# Patient Record
Sex: MALE | Race: BLACK OR AFRICAN AMERICAN | NOT HISPANIC OR LATINO | URBAN - METROPOLITAN AREA
[De-identification: names, ages, dates, MRNs, and addresses within clinical notes are randomized per-mention and may not be internally consistent; named-entity substitution may affect disease eponyms.]

---

## 2021-07-01 ENCOUNTER — OUTPATIENT (OUTPATIENT)
Dept: OUTPATIENT SERVICES | Facility: HOSPITAL | Age: 46
LOS: 1 days | End: 2021-07-01
Payer: MEDICAID

## 2021-07-01 ENCOUNTER — OUTPATIENT (OUTPATIENT)
Dept: OUTPATIENT SERVICES | Facility: HOSPITAL | Age: 46
LOS: 1 days | End: 2021-07-01

## 2021-07-07 ENCOUNTER — INPATIENT (INPATIENT)
Facility: HOSPITAL | Age: 46
LOS: 0 days | Discharge: HOME | DRG: 202 | End: 2021-07-08
Attending: STUDENT IN AN ORGANIZED HEALTH CARE EDUCATION/TRAINING PROGRAM | Admitting: HOSPITALIST
Payer: COMMERCIAL

## 2021-07-07 VITALS
RESPIRATION RATE: 30 BRPM | HEART RATE: 112 BPM | DIASTOLIC BLOOD PRESSURE: 74 MMHG | OXYGEN SATURATION: 94 % | SYSTOLIC BLOOD PRESSURE: 140 MMHG | TEMPERATURE: 98 F

## 2021-07-07 DIAGNOSIS — R00.0 TACHYCARDIA, UNSPECIFIED: ICD-10-CM

## 2021-07-07 DIAGNOSIS — J45.901 UNSPECIFIED ASTHMA WITH (ACUTE) EXACERBATION: ICD-10-CM

## 2021-07-07 DIAGNOSIS — I10 ESSENTIAL (PRIMARY) HYPERTENSION: ICD-10-CM

## 2021-07-07 DIAGNOSIS — R63.8 OTHER SYMPTOMS AND SIGNS CONCERNING FOOD AND FLUID INTAKE: ICD-10-CM

## 2021-07-07 LAB
ALBUMIN SERPL ELPH-MCNC: 4.5 G/DL — SIGNIFICANT CHANGE UP (ref 3.3–5)
ALP SERPL-CCNC: 79 U/L — SIGNIFICANT CHANGE UP (ref 40–120)
ALT FLD-CCNC: 24 U/L — SIGNIFICANT CHANGE UP (ref 10–45)
ANION GAP SERPL CALC-SCNC: 10 MMOL/L — SIGNIFICANT CHANGE UP (ref 5–17)
APTT BLD: 28.6 SEC — SIGNIFICANT CHANGE UP (ref 27.5–35.5)
AST SERPL-CCNC: 18 U/L — SIGNIFICANT CHANGE UP (ref 10–40)
BASE EXCESS BLDV CALC-SCNC: 4.5 MMOL/L — HIGH (ref -2–3)
BASE EXCESS BLDV CALC-SCNC: 5.2 MMOL/L — HIGH (ref -2–3)
BASOPHILS # BLD AUTO: 0.02 K/UL — SIGNIFICANT CHANGE UP (ref 0–0.2)
BASOPHILS NFR BLD AUTO: 0.2 % — SIGNIFICANT CHANGE UP (ref 0–2)
BILIRUB SERPL-MCNC: 0.3 MG/DL — SIGNIFICANT CHANGE UP (ref 0.2–1.2)
BUN SERPL-MCNC: 15 MG/DL — SIGNIFICANT CHANGE UP (ref 7–23)
CA-I SERPL-SCNC: 1.2 MMOL/L — SIGNIFICANT CHANGE UP (ref 1.15–1.33)
CA-I SERPL-SCNC: 1.21 MMOL/L — SIGNIFICANT CHANGE UP (ref 1.15–1.33)
CALCIUM SERPL-MCNC: 9.7 MG/DL — SIGNIFICANT CHANGE UP (ref 8.4–10.5)
CHLORIDE SERPL-SCNC: 95 MMOL/L — LOW (ref 96–108)
CO2 BLDV-SCNC: 35.8 MMOL/L — HIGH (ref 22–26)
CO2 BLDV-SCNC: 37.2 MMOL/L — HIGH (ref 22–26)
CO2 SERPL-SCNC: 32 MMOL/L — HIGH (ref 22–31)
CREAT SERPL-MCNC: 0.92 MG/DL — SIGNIFICANT CHANGE UP (ref 0.5–1.3)
EOSINOPHIL # BLD AUTO: 0.02 K/UL — SIGNIFICANT CHANGE UP (ref 0–0.5)
EOSINOPHIL NFR BLD AUTO: 0.2 % — SIGNIFICANT CHANGE UP (ref 0–6)
GAS PNL BLDV: 133 MMOL/L — LOW (ref 136–145)
GAS PNL BLDV: 134 MMOL/L — LOW (ref 136–145)
GAS PNL BLDV: SIGNIFICANT CHANGE UP
GLUCOSE BLDC GLUCOMTR-MCNC: 165 MG/DL — HIGH (ref 70–99)
GLUCOSE SERPL-MCNC: 209 MG/DL — HIGH (ref 70–99)
HCO3 BLDV-SCNC: 34 MMOL/L — HIGH (ref 22–29)
HCO3 BLDV-SCNC: 35 MMOL/L — HIGH (ref 22–29)
HCT VFR BLD CALC: 50.2 % — HIGH (ref 39–50)
HGB BLD-MCNC: 16.8 G/DL — SIGNIFICANT CHANGE UP (ref 13–17)
IMM GRANULOCYTES NFR BLD AUTO: 0.5 % — SIGNIFICANT CHANGE UP (ref 0–1.5)
INR BLD: 1.26 — HIGH (ref 0.88–1.16)
LACTATE SERPL-SCNC: 1.6 MMOL/L — SIGNIFICANT CHANGE UP (ref 0.5–2)
LYMPHOCYTES # BLD AUTO: 1.74 K/UL — SIGNIFICANT CHANGE UP (ref 1–3.3)
LYMPHOCYTES # BLD AUTO: 16.1 % — SIGNIFICANT CHANGE UP (ref 13–44)
MAGNESIUM SERPL-MCNC: 2.2 MG/DL — SIGNIFICANT CHANGE UP (ref 1.6–2.6)
MCHC RBC-ENTMCNC: 30.5 PG — SIGNIFICANT CHANGE UP (ref 27–34)
MCHC RBC-ENTMCNC: 33.5 GM/DL — SIGNIFICANT CHANGE UP (ref 32–36)
MCV RBC AUTO: 91.1 FL — SIGNIFICANT CHANGE UP (ref 80–100)
MONOCYTES # BLD AUTO: 0.71 K/UL — SIGNIFICANT CHANGE UP (ref 0–0.9)
MONOCYTES NFR BLD AUTO: 6.6 % — SIGNIFICANT CHANGE UP (ref 2–14)
NEUTROPHILS # BLD AUTO: 8.29 K/UL — HIGH (ref 1.8–7.4)
NEUTROPHILS NFR BLD AUTO: 76.4 % — SIGNIFICANT CHANGE UP (ref 43–77)
NRBC # BLD: 0 /100 WBCS — SIGNIFICANT CHANGE UP (ref 0–0)
NT-PROBNP SERPL-SCNC: 50 PG/ML — SIGNIFICANT CHANGE UP (ref 0–300)
PCO2 BLDV: 67 MMHG — HIGH (ref 42–55)
PCO2 BLDV: 81 MMHG — HIGH (ref 42–55)
PH BLDV: 7.24 — LOW (ref 7.32–7.43)
PH BLDV: 7.31 — LOW (ref 7.32–7.43)
PLATELET # BLD AUTO: 348 K/UL — SIGNIFICANT CHANGE UP (ref 150–400)
PO2 BLDV: 34 MMHG — SIGNIFICANT CHANGE UP
PO2 BLDV: 37 MMHG — SIGNIFICANT CHANGE UP
POTASSIUM BLDV-SCNC: 3.4 MMOL/L — LOW (ref 3.5–5.1)
POTASSIUM BLDV-SCNC: 4 MMOL/L — SIGNIFICANT CHANGE UP (ref 3.5–5.1)
POTASSIUM SERPL-MCNC: 4 MMOL/L — SIGNIFICANT CHANGE UP (ref 3.5–5.3)
POTASSIUM SERPL-SCNC: 4 MMOL/L — SIGNIFICANT CHANGE UP (ref 3.5–5.3)
PROT SERPL-MCNC: 8 G/DL — SIGNIFICANT CHANGE UP (ref 6–8.3)
PROTHROM AB SERPL-ACNC: 15 SEC — HIGH (ref 10.6–13.6)
RBC # BLD: 5.51 M/UL — SIGNIFICANT CHANGE UP (ref 4.2–5.8)
RBC # FLD: 12.3 % — SIGNIFICANT CHANGE UP (ref 10.3–14.5)
SAO2 % BLDV: 39.5 % — SIGNIFICANT CHANGE UP
SAO2 % BLDV: 51.8 % — SIGNIFICANT CHANGE UP
SODIUM SERPL-SCNC: 137 MMOL/L — SIGNIFICANT CHANGE UP (ref 135–145)
TROPONIN T SERPL-MCNC: 0.01 NG/ML — SIGNIFICANT CHANGE UP (ref 0–0.01)
WBC # BLD: 10.83 K/UL — HIGH (ref 3.8–10.5)
WBC # FLD AUTO: 10.83 K/UL — HIGH (ref 3.8–10.5)

## 2021-07-07 PROCEDURE — 93010 ELECTROCARDIOGRAM REPORT: CPT

## 2021-07-07 PROCEDURE — 99223 1ST HOSP IP/OBS HIGH 75: CPT

## 2021-07-07 PROCEDURE — 99291 CRITICAL CARE FIRST HOUR: CPT

## 2021-07-07 PROCEDURE — 71045 X-RAY EXAM CHEST 1 VIEW: CPT | Mod: 26

## 2021-07-07 RX ORDER — DEXTROSE 50 % IN WATER 50 %
15 SYRINGE (ML) INTRAVENOUS ONCE
Refills: 0 | Status: DISCONTINUED | OUTPATIENT
Start: 2021-07-07 | End: 2021-07-08

## 2021-07-07 RX ORDER — ALBUTEROL 90 UG/1
2.5 AEROSOL, METERED ORAL
Refills: 0 | Status: COMPLETED | OUTPATIENT
Start: 2021-07-07 | End: 2021-07-07

## 2021-07-07 RX ORDER — GLUCAGON INJECTION, SOLUTION 0.5 MG/.1ML
1 INJECTION, SOLUTION SUBCUTANEOUS ONCE
Refills: 0 | Status: DISCONTINUED | OUTPATIENT
Start: 2021-07-07 | End: 2021-07-08

## 2021-07-07 RX ORDER — MAGNESIUM SULFATE 500 MG/ML
2 VIAL (ML) INJECTION ONCE
Refills: 0 | Status: COMPLETED | OUTPATIENT
Start: 2021-07-07 | End: 2021-07-07

## 2021-07-07 RX ORDER — INSULIN LISPRO 100/ML
VIAL (ML) SUBCUTANEOUS
Refills: 0 | Status: DISCONTINUED | OUTPATIENT
Start: 2021-07-07 | End: 2021-07-08

## 2021-07-07 RX ORDER — DEXTROSE 50 % IN WATER 50 %
25 SYRINGE (ML) INTRAVENOUS ONCE
Refills: 0 | Status: DISCONTINUED | OUTPATIENT
Start: 2021-07-07 | End: 2021-07-08

## 2021-07-07 RX ORDER — SODIUM CHLORIDE 9 MG/ML
1000 INJECTION, SOLUTION INTRAVENOUS
Refills: 0 | Status: DISCONTINUED | OUTPATIENT
Start: 2021-07-07 | End: 2021-07-08

## 2021-07-07 RX ORDER — IPRATROPIUM/ALBUTEROL SULFATE 18-103MCG
3 AEROSOL WITH ADAPTER (GRAM) INHALATION ONCE
Refills: 0 | Status: COMPLETED | OUTPATIENT
Start: 2021-07-07 | End: 2021-07-07

## 2021-07-07 RX ORDER — PANTOPRAZOLE SODIUM 20 MG/1
40 TABLET, DELAYED RELEASE ORAL
Refills: 0 | Status: DISCONTINUED | OUTPATIENT
Start: 2021-07-07 | End: 2021-07-08

## 2021-07-07 RX ORDER — DEXTROSE 50 % IN WATER 50 %
12.5 SYRINGE (ML) INTRAVENOUS ONCE
Refills: 0 | Status: DISCONTINUED | OUTPATIENT
Start: 2021-07-07 | End: 2021-07-08

## 2021-07-07 RX ORDER — IPRATROPIUM/ALBUTEROL SULFATE 18-103MCG
3 AEROSOL WITH ADAPTER (GRAM) INHALATION EVERY 6 HOURS
Refills: 0 | Status: DISCONTINUED | OUTPATIENT
Start: 2021-07-07 | End: 2021-07-08

## 2021-07-07 RX ADMIN — ALBUTEROL 2.5 MILLIGRAM(S): 90 AEROSOL, METERED ORAL at 22:16

## 2021-07-07 RX ADMIN — Medication 2 GRAM(S): at 23:06

## 2021-07-07 RX ADMIN — ALBUTEROL 2.5 MILLIGRAM(S): 90 AEROSOL, METERED ORAL at 21:10

## 2021-07-07 RX ADMIN — ALBUTEROL 2.5 MILLIGRAM(S): 90 AEROSOL, METERED ORAL at 21:09

## 2021-07-07 RX ADMIN — ALBUTEROL 2.5 MILLIGRAM(S): 90 AEROSOL, METERED ORAL at 21:11

## 2021-07-07 RX ADMIN — Medication 3 MILLILITER(S): at 21:09

## 2021-07-07 RX ADMIN — Medication 50 GRAM(S): at 21:59

## 2021-07-07 RX ADMIN — Medication 125 MILLIGRAM(S): at 21:09

## 2021-07-07 RX ADMIN — ALBUTEROL 2.5 MILLIGRAM(S): 90 AEROSOL, METERED ORAL at 21:58

## 2021-07-07 RX ADMIN — ALBUTEROL 2.5 MILLIGRAM(S): 90 AEROSOL, METERED ORAL at 22:46

## 2021-07-07 NOTE — H&P ADULT - NSHPLABSRESULTS_GEN_ALL_CORE
.  LABS                        16.8   10.83 )-----------( 348      ( 07 Jul 2021 21:09 )             50.2     07-07    137  |  95<L>  |  15  ----------------------------<  209<H>  4.0   |  32<H>  |  0.92    Ca    9.7      07 Jul 2021 21:09  Mg     2.2     07-07    TPro  8.0  /  Alb  4.5  /  TBili  0.3  /  DBili  x   /  AST  18  /  ALT  24  /  AlkPhos  79  07-07    PT/INR - ( 07 Jul 2021 21:09 )   PT: 15.0 sec;   INR: 1.26          PTT - ( 07 Jul 2021 21:09 )  PTT:28.6 sec    CARDIAC MARKERS ( 07 Jul 2021 21:09 )  x     / 0.01 ng/mL / x     / x     / x            RADIOLOGY & ADDITIONAL TESTS: Reviewed

## 2021-07-07 NOTE — H&P ADULT - ATTENDING COMMENTS
44 y/o male with PMHx asthma (no intubations) presents to ED via EMS for shortness of breath. Intermittently placed on BiPAP for which he is now weaned off to 4LNC. Admitted to [facility] for asthma exacerbation.     #Asthma Exacerbation: Pt w/ hx of asthma exacerbations, seasonal and poorly controlled asthma; uses rescue inhaler 2-3x a day; presents with worsening SOB/Tachypnea for one day. No fevers/chills; no cough/sputum production +nasal congestion; CXR w/o acute infiltrate; s/p mag, nebs and solumedrol 125x1 with significant improvement; repeat vbg improved; now off BiPAP; unlcear etiology of exacerbation maybe seasonal vs viral URI; f/up RVP and procal; c/w solumedrol 40mg BID; ISS; PPI; dounebs standing q6hrs; outpt vs inpt pulm consult. c/w home CPAP at bedtime for DON. PCP Dr. Araiza

## 2021-07-07 NOTE — ED PROVIDER NOTE - NS ED ROS FT
Constitutional: No fever or chills.   Eyes: No pain, blurry vision, or discharge.  ENMT: No hearing changes, pain, discharge or infections. No neck pain or stiffness.  Cardiac: No chest pain, + SOB, no edema. No chest pain with exertion.  Respiratory: + History of asthma, +SOB and respiratory distress.   GI: No nausea, vomiting, diarrhea or abdominal pain.  : No dysuria, frequency or burning.  MS: No myalgia, muscle weakness, joint pain or back pain.  Neuro: No headache or weakness. No LOC.  Skin: No skin rash.   Endocrine: No history of thyroid disease or diabetes.  Except as documented in the HPI, all other systems are negative.

## 2021-07-07 NOTE — H&P ADULT - PROBLEM SELECTOR PLAN 1
SOB came suddenly at approx 7pm. He waited 30 minutes prior to contacting EMS. Symptoms consisted of chest tightness and difficulty breathing. Admits to using 10x rescue inhaler and 1x albuterol nebs without relief. Says symptoms feel similar his prior asthma exacerbations, last occurring 5 years ago. Admits to slight increase in rhinorrhea, lacrimation, and dry cough. Takes albuterol nebs, albuterol HFA, and symbicort at home.   - s/p CPAP and BiPAP -> weaned down to 4LNC in the ED by primary team  - s/p solumedrol 125mg IVP x1  - plan to start solumedrol 40mg IVP BID and transition to PO regimen thereafter  - FSG and PPI while on steroids  - PRN duonebs for wheezing SOB came suddenly at approx 7pm. He waited 30 minutes prior to contacting EMS. Symptoms consisted of chest tightness and difficulty breathing. Admits to using 10x rescue inhaler and 1x albuterol nebs without relief. Says symptoms feel similar his prior asthma exacerbations, last occurring 5 years ago. Admits to slight increase in rhinorrhea, lacrimation, and dry cough. Takes albuterol nebs, albuterol HFA, and Symbicort at home.   - s/p CPAP and BiPAP -> weaned down to 4LNC in the ED by primary team  - s/p solumedrol 125mg IVP x1  - plan to start solumedrol 40mg IVP BID and transition to PO regimen thereafter  - FSG and PPI while on steroids  - PRN duonebs for wheezing  - wean down to room air  - f/u RVP SOB came suddenly at approx 7pm. He waited 30 minutes prior to contacting EMS. Symptoms consisted of chest tightness and difficulty breathing. Admits to using 10x rescue inhaler and 1x albuterol nebs without relief. Says symptoms feel similar his prior asthma exacerbations, last occurring 5 years ago. Admits to slight increase in rhinorrhea, lacrimation, and dry cough. Takes albuterol nebs, albuterol HFA, and Symbicort at home.   - s/p CPAP and BiPAP -> weaned down to 4LNC in the ED by primary team  - s/p solumedrol 125mg IVP x1  - plan to start solumedrol 40mg IVP BID and transition to PO regimen thereafter  - FSG and PPI while on steroids  - standing duonebs q6h for wheezing  - wean down to room air  - RVP negative  - f/u AM procal

## 2021-07-07 NOTE — ED PROVIDER NOTE - CLINICAL SUMMARY MEDICAL DECISION MAKING FREE TEXT BOX
Patient presents to ED with concern for respiratory distress.  Hx of asthma noted.  Transitioned from cpap to bipap on arrival to ED.  Given nebs, steroids and labs, CXR completed.  On re eval, patient clinically significantly improved.  Speaking in full sentences when initially only able to speak in short phrases.  On re eval, patient still with some refractory wheezes and is given another round of nebs, mag.  CXR not concerning for volume overload or infectious process.  Initial VBG noted, repeat completed and showing improvement.  Will plan for regional admission with close monitoring, continued nebs, bipap, etc.  Patient is aware of plan and in agreement.  Remains stable/improved and ready for admission at this time.

## 2021-07-07 NOTE — H&P ADULT - PROBLEM SELECTOR PLAN 3
on admission likely secondary to asthma exacerbation and from multiple rounds of nebulizer treatment (side effect). Upon assessment in the ED he denied any chest pain and palpitations  - Troponin negative  - f/u EKG  - continue to monitor

## 2021-07-07 NOTE — ED PROVIDER NOTE - CARE PLAN
Principal Discharge DX:	Asthma exacerbation  Secondary Diagnosis:	Shortness of breath  Secondary Diagnosis:	Hypoxia

## 2021-07-07 NOTE — H&P ADULT - PROBLEM SELECTOR PLAN 4
F: tolerating PO, no IVF  E: replete K<4, Mg<2  N: Dash/TLC  VTE Prophylaxis: None (IMPROVE Score 0)  GI: pantoprazole (while on steroids)  C: Full Code  D: RMF

## 2021-07-07 NOTE — H&P ADULT - NSHPSOCIALHISTORY_GEN_ALL_CORE
Lives in a [facility] shelter. Quit smoking 15 years ago, but admits to roughly 10 year history for which he smoked 1-2ppd. Social alcohol consumption. Denies recreational drug use. Independent of his ADLs.

## 2021-07-07 NOTE — ED PROVIDER NOTE - PROGRESS NOTE DETAILS
Patient feeling significantly improved on bipap, following nebs.  Initial VBG noted, will repeat as patient on bipap for over one hour (in cpap in field, transitioned to bipap on arrival to ED).  On re eval, patient with significant improvement in air movement throughout bilateral lung fields, still with some refractory wheezing.  Given additional nebs, magnesium.  Steroid previously given.

## 2021-07-07 NOTE — H&P ADULT - HISTORY OF PRESENT ILLNESS
46 y/o Male with PMHx asthma (no intubations) and hypertension who presents to ED via EMS for shortness of breath. He reports SOB came suddenly around 7pm for 30 minutes prior to contacting EMS. Says symptoms feel similar his prior asthma exacerbations. Symptoms consisted of chest tightness and difficulty breathing for which he reports to using albuterol nebs x1 and rescue inhaler x10. Last reported asthma exacerbation that required hospitalization 5 years ago, no history of intubations. Typically uses nebulizer once per day, albuterol rescue 2x in the AM, and standing Symbicort. Admits to slightly increased rhinorrhea, lacrimation, and dry cough. Denies sick contacts, recent travel, immobility, N/V/D/C. En route to [facility] ED he was given sublingual nitro, epi and nebulizers. Also placed on CPAP. Of note, received 2nd dose of COVID-19 vaccine on July 2nd.     ED Course  Vitals: 98F (axillary), , /74, satting 94% on CPAP, RR 30 -> BiPAP satting 96%, RR 20  Labs: notable for WBC 10.83, Hgb 16.8/Hct 50.2%, INR 1.26, Cl 95, HCO3 32, Glc 209; lactate, trop-T, proBNP wnl; VBG on CPAP: pH 7.24/pCO2 81/pO2 34; VBG on BiPAP pH 7.31/pCO2 67/pO2 37  Imaging: CXR (wet read) - increased L pulm vasculature markings  Management: solumedrol 125mg IVP x1, albuterol nebs x5, duonebs x1, Mag 2g. Intermittently placed on BiPAP with significant improvement with air movement. Later transitioned to 4LNC.

## 2021-07-07 NOTE — H&P ADULT - PROBLEM SELECTOR PLAN 2
/74 on admission. Pure surescripts, patient takes norvasc 10mg daily and chlorthalidone 50 mg daily. Patient unsure which medications he takes.   - med rec in AM

## 2021-07-07 NOTE — ED PROVIDER NOTE - OBJECTIVE STATEMENT
45 year old male with history of asthma presents to ED via EMS transport secondary to concern for shortness of breath, sudden in onset approximately 30 minutes prior to contacting EMS/ED transport.  Patient notes symptoms feel similar to what he has experienced with asthma exacerbation in the past.  He was given SL nitro, epi and nebulizers by EMS en route to ED.  Patient denies history of CHF, peripheral edema, chest pain, recent travel, history of DVT/PE, history of past intubations, fever, chills, cough/URI symptoms, abdominal pain, nausea, emesis, changes to bowel movements, or any additional acute complaints or concerns at this time.  Patient does note multiple past admissions for asthma in the past.

## 2021-07-07 NOTE — H&P ADULT - ASSESSMENT
44 y/o male with PMHx asthma (no intubations) presents to ED via EMS for shortness of breath. Intermittently placed on BiPAP for which he is now weaned off to 4LNC. Admitted to [facility] for asthma exacerbation.

## 2021-07-07 NOTE — H&P ADULT - NSHPPHYSICALEXAM_GEN_ALL_CORE
Constitutional: obese gentleman, NAD, resting comfortably in bed on 4LNC  Head: NC/AT  Eyes: PERRL, EOMI, anicteric sclera  ENT: no nasal discharge; uvula midline, no oropharyngeal erythema or exudates; MMM  Neck: supple; no JVD   Respiratory: CTA B/L; no W/R/R, able to speak in complete sentences (assessed after receiving steroids and nebs)  Cardiac: +S1/S2; RRR; no M/R/G  Gastrointestinal: obese abdomen, NT/ND; no rebound or guarding; +BSx4  Back: spine midline, no bony tenderness or step-offs; no CVAT B/L  Extremities: WWP, no clubbing or cyanosis; no peripheral edema  Musculoskeletal: NROM x4; no joint swelling, tenderness or erythema  Vascular: 2+ radial, femoral, DP/PT pulses B/L  Dermatologic: skin warm, dry and intact; no rashes, wounds, or scars  Lymphatic: no submandibular or cervical LAD  Neurologic: AAOx3; CNII-XII grossly intact; no focal deficits  Psychiatric: affect and characteristics of appearance, verbalizations, behaviors are appropriate Constitutional: obese gentleman, NAD, resting comfortably in bed on 4LNC  Head: NC/AT  Eyes: PERRL, EOMI, anicteric sclera  ENT: no nasal discharge, but sounds congested; uvula midline, no oropharyngeal erythema or exudates; MMM  Neck: supple; no JVD   Respiratory: CTA B/L; no W/R/R, able to speak in complete sentences (assessed after receiving steroids and nebs)  Cardiac: +S1/S2; RRR; no M/R/G  Gastrointestinal: obese abdomen, NT/ND; no rebound or guarding; +BSx4  Back: spine midline, no bony tenderness or step-offs; no CVAT B/L  Extremities: WWP, no clubbing or cyanosis; no peripheral edema  Musculoskeletal: NROM x4; no joint swelling, tenderness or erythema  Vascular: 2+ radial, femoral, DP/PT pulses B/L  Dermatologic: skin warm, dry and intact; no rashes, wounds, or scars  Lymphatic: no submandibular or cervical LAD  Neurologic: AAOx3; CNII-XII grossly intact; no focal deficits  Psychiatric: affect and characteristics of appearance, verbalizations, behaviors are appropriate

## 2021-07-07 NOTE — ED PROVIDER NOTE - PHYSICAL EXAMINATION
VITAL SIGNS: I have reviewed nursing notes and confirm.  CONSTITUTIONAL: Well-developed; well-nourished; in no acute distress.   SKIN:  Warm and dry, no acute rash.   HEAD:  normocephalic, atraumatic.  EYES: PERRL.  EOM intact; conjunctiva and sclera clear.  ENT: No nasal discharge; airway clear.   NECK: Supple; non tender.  CARD: S1, S2 normal; no murmurs, gallops, or rubs. Regular rate and rhythm.   RESP:  + Bilateral wheezes with decreased air movement to bilateral lung fields, no retractions, able to speak in short sentences.  ABD: Normal bowel sounds; soft; non-distended; non-tender; no guarding/rebound.  EXT: Normal ROM. No clubbing, cyanosis or edema. 2+ pulses to b/l ue/le.  NEURO: Alert, oriented, grossly unremarkable.  5/5 strength x 4 extremities against gravity and external force.  No drift x 4 extremities.  Sensation intact and symmetric x 4 extremities.  No facial asymmetry.    PSYCH: Cooperative, mood and affect appropriate.

## 2021-07-07 NOTE — ED ADULT NURSE NOTE - OBJECTIVE STATEMENT
BIBA for SOB. Endorses hx of asthma, no hx of intubation. Pt on CPAP on arrival, SpO2 94%. Dr. Bray at bedside for upgrade.

## 2021-07-08 VITALS
SYSTOLIC BLOOD PRESSURE: 148 MMHG | HEART RATE: 85 BPM | DIASTOLIC BLOOD PRESSURE: 81 MMHG | RESPIRATION RATE: 18 BRPM | TEMPERATURE: 98 F | OXYGEN SATURATION: 95 %

## 2021-07-08 LAB
A1C WITH ESTIMATED AVERAGE GLUCOSE RESULT: 5.5 % — SIGNIFICANT CHANGE UP (ref 4–5.6)
ALBUMIN SERPL ELPH-MCNC: 4 G/DL — SIGNIFICANT CHANGE UP (ref 3.3–5)
ALP SERPL-CCNC: 68 U/L — SIGNIFICANT CHANGE UP (ref 40–120)
ALT FLD-CCNC: 21 U/L — SIGNIFICANT CHANGE UP (ref 10–45)
ANION GAP SERPL CALC-SCNC: 13 MMOL/L — SIGNIFICANT CHANGE UP (ref 5–17)
AST SERPL-CCNC: 15 U/L — SIGNIFICANT CHANGE UP (ref 10–40)
BASOPHILS # BLD AUTO: 0.01 K/UL — SIGNIFICANT CHANGE UP (ref 0–0.2)
BASOPHILS NFR BLD AUTO: 0.1 % — SIGNIFICANT CHANGE UP (ref 0–2)
BILIRUB SERPL-MCNC: 0.3 MG/DL — SIGNIFICANT CHANGE UP (ref 0.2–1.2)
BUN SERPL-MCNC: 14 MG/DL — SIGNIFICANT CHANGE UP (ref 7–23)
CALCIUM SERPL-MCNC: 9.3 MG/DL — SIGNIFICANT CHANGE UP (ref 8.4–10.5)
CHLORIDE SERPL-SCNC: 98 MMOL/L — SIGNIFICANT CHANGE UP (ref 96–108)
CO2 SERPL-SCNC: 25 MMOL/L — SIGNIFICANT CHANGE UP (ref 22–31)
COVID-19 SPIKE DOMAIN AB INTERP: POSITIVE
COVID-19 SPIKE DOMAIN ANTIBODY RESULT: >250 U/ML — HIGH
CREAT SERPL-MCNC: 0.74 MG/DL — SIGNIFICANT CHANGE UP (ref 0.5–1.3)
EOSINOPHIL # BLD AUTO: 0 K/UL — SIGNIFICANT CHANGE UP (ref 0–0.5)
EOSINOPHIL NFR BLD AUTO: 0 % — SIGNIFICANT CHANGE UP (ref 0–6)
ESTIMATED AVERAGE GLUCOSE: 111 MG/DL — SIGNIFICANT CHANGE UP (ref 68–114)
GLUCOSE BLDC GLUCOMTR-MCNC: 124 MG/DL — HIGH (ref 70–99)
GLUCOSE BLDC GLUCOMTR-MCNC: 125 MG/DL — HIGH (ref 70–99)
GLUCOSE SERPL-MCNC: 120 MG/DL — HIGH (ref 70–99)
HCT VFR BLD CALC: 45.9 % — SIGNIFICANT CHANGE UP (ref 39–50)
HGB BLD-MCNC: 15.5 G/DL — SIGNIFICANT CHANGE UP (ref 13–17)
IMM GRANULOCYTES NFR BLD AUTO: 0.6 % — SIGNIFICANT CHANGE UP (ref 0–1.5)
LYMPHOCYTES # BLD AUTO: 0.64 K/UL — LOW (ref 1–3.3)
LYMPHOCYTES # BLD AUTO: 6.2 % — LOW (ref 13–44)
MAGNESIUM SERPL-MCNC: 2.3 MG/DL — SIGNIFICANT CHANGE UP (ref 1.6–2.6)
MCHC RBC-ENTMCNC: 29.8 PG — SIGNIFICANT CHANGE UP (ref 27–34)
MCHC RBC-ENTMCNC: 33.8 GM/DL — SIGNIFICANT CHANGE UP (ref 32–36)
MCV RBC AUTO: 88.1 FL — SIGNIFICANT CHANGE UP (ref 80–100)
MONOCYTES # BLD AUTO: 0.27 K/UL — SIGNIFICANT CHANGE UP (ref 0–0.9)
MONOCYTES NFR BLD AUTO: 2.6 % — SIGNIFICANT CHANGE UP (ref 2–14)
NEUTROPHILS # BLD AUTO: 9.33 K/UL — HIGH (ref 1.8–7.4)
NEUTROPHILS NFR BLD AUTO: 90.5 % — HIGH (ref 43–77)
NRBC # BLD: 0 /100 WBCS — SIGNIFICANT CHANGE UP (ref 0–0)
PHOSPHATE SERPL-MCNC: 3.5 MG/DL — SIGNIFICANT CHANGE UP (ref 2.5–4.5)
PLATELET # BLD AUTO: 306 K/UL — SIGNIFICANT CHANGE UP (ref 150–400)
POTASSIUM SERPL-MCNC: 3.7 MMOL/L — SIGNIFICANT CHANGE UP (ref 3.5–5.3)
POTASSIUM SERPL-SCNC: 3.7 MMOL/L — SIGNIFICANT CHANGE UP (ref 3.5–5.3)
PROCALCITONIN SERPL-MCNC: 0.41 NG/ML — HIGH (ref 0.02–0.1)
PROT SERPL-MCNC: 7.7 G/DL — SIGNIFICANT CHANGE UP (ref 6–8.3)
RAPID RVP RESULT: SIGNIFICANT CHANGE UP
RBC # BLD: 5.21 M/UL — SIGNIFICANT CHANGE UP (ref 4.2–5.8)
RBC # FLD: 12.3 % — SIGNIFICANT CHANGE UP (ref 10.3–14.5)
SARS-COV-2 IGG+IGM SERPL QL IA: >250 U/ML — HIGH
SARS-COV-2 IGG+IGM SERPL QL IA: POSITIVE
SARS-COV-2 RNA SPEC QL NAA+PROBE: SIGNIFICANT CHANGE UP
SARS-COV-2 RNA SPEC QL NAA+PROBE: SIGNIFICANT CHANGE UP
SODIUM SERPL-SCNC: 136 MMOL/L — SIGNIFICANT CHANGE UP (ref 135–145)
WBC # BLD: 10.31 K/UL — SIGNIFICANT CHANGE UP (ref 3.8–10.5)
WBC # FLD AUTO: 10.31 K/UL — SIGNIFICANT CHANGE UP (ref 3.8–10.5)

## 2021-07-08 PROCEDURE — 84132 ASSAY OF SERUM POTASSIUM: CPT

## 2021-07-08 PROCEDURE — 82962 GLUCOSE BLOOD TEST: CPT

## 2021-07-08 PROCEDURE — 94660 CPAP INITIATION&MGMT: CPT

## 2021-07-08 PROCEDURE — 82330 ASSAY OF CALCIUM: CPT

## 2021-07-08 PROCEDURE — 86769 SARS-COV-2 COVID-19 ANTIBODY: CPT

## 2021-07-08 PROCEDURE — U0003: CPT

## 2021-07-08 PROCEDURE — 99291 CRITICAL CARE FIRST HOUR: CPT | Mod: 25

## 2021-07-08 PROCEDURE — 96365 THER/PROPH/DIAG IV INF INIT: CPT

## 2021-07-08 PROCEDURE — 83735 ASSAY OF MAGNESIUM: CPT

## 2021-07-08 PROCEDURE — 99239 HOSP IP/OBS DSCHRG MGMT >30: CPT | Mod: GC

## 2021-07-08 PROCEDURE — 80053 COMPREHEN METABOLIC PANEL: CPT

## 2021-07-08 PROCEDURE — 83036 HEMOGLOBIN GLYCOSYLATED A1C: CPT

## 2021-07-08 PROCEDURE — 82803 BLOOD GASES ANY COMBINATION: CPT

## 2021-07-08 PROCEDURE — 93005 ELECTROCARDIOGRAM TRACING: CPT

## 2021-07-08 PROCEDURE — 0225U NFCT DS DNA&RNA 21 SARSCOV2: CPT

## 2021-07-08 PROCEDURE — 84484 ASSAY OF TROPONIN QUANT: CPT

## 2021-07-08 PROCEDURE — 84145 PROCALCITONIN (PCT): CPT

## 2021-07-08 PROCEDURE — 83880 ASSAY OF NATRIURETIC PEPTIDE: CPT

## 2021-07-08 PROCEDURE — 96375 TX/PRO/DX INJ NEW DRUG ADDON: CPT

## 2021-07-08 PROCEDURE — 71045 X-RAY EXAM CHEST 1 VIEW: CPT

## 2021-07-08 PROCEDURE — U0005: CPT

## 2021-07-08 PROCEDURE — 36415 COLL VENOUS BLD VENIPUNCTURE: CPT

## 2021-07-08 PROCEDURE — 85730 THROMBOPLASTIN TIME PARTIAL: CPT

## 2021-07-08 PROCEDURE — 84100 ASSAY OF PHOSPHORUS: CPT

## 2021-07-08 PROCEDURE — 94640 AIRWAY INHALATION TREATMENT: CPT

## 2021-07-08 PROCEDURE — 83605 ASSAY OF LACTIC ACID: CPT

## 2021-07-08 PROCEDURE — 84295 ASSAY OF SERUM SODIUM: CPT

## 2021-07-08 PROCEDURE — 85610 PROTHROMBIN TIME: CPT

## 2021-07-08 PROCEDURE — 85025 COMPLETE CBC W/AUTO DIFF WBC: CPT

## 2021-07-08 RX ORDER — ALBUTEROL 90 UG/1
2.5 AEROSOL, METERED ORAL ONCE
Refills: 0 | Status: DISCONTINUED | OUTPATIENT
Start: 2021-07-08 | End: 2021-07-08

## 2021-07-08 RX ORDER — IPRATROPIUM/ALBUTEROL SULFATE 18-103MCG
3 AEROSOL WITH ADAPTER (GRAM) INHALATION EVERY 6 HOURS
Refills: 0 | Status: DISCONTINUED | OUTPATIENT
Start: 2021-07-08 | End: 2021-07-08

## 2021-07-08 RX ORDER — ALBUTEROL 90 UG/1
2 AEROSOL, METERED ORAL EVERY 6 HOURS
Refills: 0 | Status: DISCONTINUED | OUTPATIENT
Start: 2021-07-08 | End: 2021-07-08

## 2021-07-08 RX ORDER — ALBUTEROL 90 UG/1
2 AEROSOL, METERED ORAL
Qty: 18 | Refills: 3
Start: 2021-07-08

## 2021-07-08 RX ADMIN — Medication 40 MILLIGRAM(S): at 07:38

## 2021-07-08 RX ADMIN — Medication 3 MILLILITER(S): at 07:39

## 2021-07-08 RX ADMIN — PANTOPRAZOLE SODIUM 40 MILLIGRAM(S): 20 TABLET, DELAYED RELEASE ORAL at 07:38

## 2021-07-08 NOTE — DISCHARGE NOTE PROVIDER - NSDCCPCAREPLAN_GEN_ALL_CORE_FT
PRINCIPAL DISCHARGE DIAGNOSIS  Diagnosis: Asthma exacerbation  Assessment and Plan of Treatment:       SECONDARY DISCHARGE DIAGNOSES  Diagnosis: Shortness of breath  Assessment and Plan of Treatment:     Diagnosis: Hypoxia  Assessment and Plan of Treatment:      PRINCIPAL DISCHARGE DIAGNOSIS  Diagnosis: Asthma exacerbation  Assessment and Plan of Treatment: You have a history of asthma and were admitted to the hospital for an acute worsening of your asthma. You were treated with corticosteroids, albuterol, duonebs, continuous airway pressure, oxygen, and magnesium. Your condition improved. You should continue using your albuterol inhaler, symbicort, and nebulizer. You should continue taking a new medication, prednisone, for 5 days. Please follow up with your primary care doctor, Dr. Hatch. An appointment has been made for you.

## 2021-07-08 NOTE — DISCHARGE NOTE PROVIDER - NSDCMRMEDTOKEN_GEN_ALL_CORE_FT
albuterol 90 mcg/inh inhalation aerosol: 2 puff(s) inhaled every 6 hours  chlorthalidone 50 mg oral tablet: 1 tab(s) orally once a day  Norvasc 10 mg oral tablet: 1 tab(s) orally once a day  Symbicort 80 mcg-4.5 mcg/inh inhalation aerosol: 2 puff(s) inhaled 2 times a day   albuterol 90 mcg/inh inhalation aerosol: 2 puff(s) inhaled every 6 hours  chlorthalidone 50 mg oral tablet: 1 tab(s) orally once a day  Norvasc 10 mg oral tablet: 1 tab(s) orally once a day  predniSONE 20 mg oral tablet: 2 tab(s) orally once a day  Symbicort 80 mcg-4.5 mcg/inh inhalation aerosol: 2 puff(s) inhaled 2 times a day

## 2021-07-08 NOTE — DISCHARGE NOTE PROVIDER - NSDCFUADDAPPT_GEN_ALL_CORE_FT
Please bring your Insurance card, Photo ID and Discharge paperwork to the following appointment:    (1) Please follow up with your Primary Care Provider, Dr. Sherman Hatch at [address] [place] [address] on 07/15/2021 at 11:00am.    Appointment was scheduled by Ms. STEFANY Olvera, Referral Coordinator.

## 2021-07-08 NOTE — DISCHARGE NOTE NURSING/CASE MANAGEMENT/SOCIAL WORK - PATIENT PORTAL LINK FT
You can access the FollowMyHealth Patient Portal offered by [facility] by registering at the following website: [email or web address]. By joining [employer]’s FollowMyHealth portal, you will also be able to view your health information using other applications (apps) compatible with our system.

## 2021-07-08 NOTE — DISCHARGE NOTE PROVIDER - HOSPITAL COURSE
Patient is 44yo M with past medical history of asthma htn  Presented with acute onset SOB and chest tightness, found to have asthma exacerbation     Problem List/Main Diagnoses (system-based):   #Asthma exacerbation.    SOB came suddenly at approx 7pm. He waited 30 minutes prior to contacting EMS. Symptoms consisted of chest tightness and difficulty breathing. Admits to using 10x rescue inhaler and 1x albuterol nebs without relief. Says symptoms feel similar his prior asthma exacerbations, last occurring 5 years ago. Admits to slight increase in rhinorrhea, lacrimation, and dry cough. Takes albuterol nebs, albuterol HFA, and Symbicort at home.   - s/p CPAP and BiPAP -> weaned down to 4LNC in the ED by primary team  - s/p solumedrol 125mg IVP x1  - plan to start solumedrol 40mg IVP BID and transition to PO regimen thereafter  - FSG and PPI while on steroids  - standing duonebs q6h for wheezing  - wean down to room air  - RVP negative  - f/u AM procal.       #HTN /74 on admission. Pure surescripts, patient takes norvasc 10mg daily and chlorthalidone 50 mg daily. Patient unsure which medications he takes.   - med rec in AM.     #Tachycardia   on admission likely secondary to asthma exacerbation and from multiple rounds of nebulizer treatment (side effect). Upon assessment in the ED he denied any chest pain and palpitations  - Troponin negative  - f/u EKG  - continue to monitor.                 Inpatient treatment course:   New medications:   Labs to be followed outpatient:   Exam to be followed outpatient:      Patient is 46yo M with past medical history of asthma htn  Presented with acute onset SOB and chest tightness, found to have asthma exacerbation     Problem List/Main Diagnoses (system-based):   #Asthma exacerbation.    SOB came suddenly at approx 7pm on 7/7. He waited 30 minutes prior to contacting EMS. Symptoms consisted of chest tightness and difficulty breathing. Admits to using 10x rescue inhaler and 1x albuterol nebs without relief. Says symptoms feel similar his prior asthma exacerbations, last occurring 5 years ago. Admits to slight increase in rhinorrhea, lacrimation, and dry cough. Takes albuterol nebs, albuterol HFA, and Symbicort at home.   - s/p CPAP and BiPAP -> weaned down to 4LNC in the ED by primary team  - s/p solumedrol 125mg IVP x1  - started solumedrol 40mg IVP BID , d/c on prednisone 40mg PO  - FSG and PPI while on steroids  - standing duonebs q6h for wheezing, changed to PRN   - weaned down to room air, satting well   - RVP negative       #HTN /74 on admission. Patient takes norvasc 10mg daily and chlorthalidone 50 mg daily at home.   -held anti-hypertensives , can re-start when d/alberta     #Tachycardia   on admission likely secondary to asthma exacerbation and from multiple rounds of nebulizer treatment (side effect). Upon assessment in the ED he denied any chest pain and palpitations  - Troponin negative  - EKG NSR  -tachy resolved       Inpatient treatment course:   In ED, vitals: 98F (axillary), , /74, satting 94% on CPAP, RR 30; transitioned to BiPAP, satting 96%, RR 20  Labs: notable for WBC 10.83, Hgb 16.8/Hct 50.2%, INR 1.26, Cl 95, HCO3 32, Glc 209; lactate, trop-T, proBNP wnl; VBG on CPAP: pH 7.24/pCO2 81/pO2 34; VBG on BiPAP pH 7.31/pCO2 67/pO2 37  Imaging: CXR (wet read) - increased L pulm vasculature markings  Was given solumedrol 125mg IVP x1, albuterol nebs x5, duonebs x1, Mag 2g. Intermittently placed on BiPAP with significant improvement with air movement. Later transitioned to 4LNC.   Overnight, started solumedrol 40mg IV BID dosing, standing duonebs q4h, pantoprazole 40mg and sliding scale. RVP negative. weaned down to RA at 615am.   7/7: satting well on RA, asymptomatic, breathing better. switched standing duonebs to PRN, d/alberta solumedrol and switched to PO 40mg prednisone.     New medications: Prednisone 40mg daily   Labs to be followed outpatient: CBC, CMP  Exam to be followed outpatient: none      Patient is 46yo M with past medical history of asthma htn  Presented with acute onset SOB and chest tightness, found to have asthma exacerbation     Problem List/Main Diagnoses (system-based):   #Asthma exacerbation.    SOB came suddenly at approx 7pm on 7/7. He waited 30 minutes prior to contacting EMS. Symptoms consisted of chest tightness and difficulty breathing. Admits to using 10x rescue inhaler and 1x albuterol nebs without relief. Says symptoms feel similar his prior asthma exacerbations, last occurring 5 years ago. Admits to slight increase in rhinorrhea, lacrimation, and dry cough. Takes albuterol nebs, albuterol HFA, and Symbicort at home.   - s/p CPAP and BiPAP -> weaned down to 4LNC in the ED by primary team  - s/p solumedrol 125mg IVP x1  - started solumedrol 40mg IVP BID , d/c on prednisone 40mg PO  - FSG and PPI while on steroids  - standing duonebs q6h for wheezing, changed to PRN   - weaned down to room air, satting well   - RVP negative    # Acute hypoxemic respiratory failure   Tachypneic at time of presentation to ED  Required CPAP and BIPAP with 100% FiO2 at time of initial presentation.   Improved with treatment of asthma exacerbation with duonebs, steroids.   Weaned off supplemental oxygen.      #HTN /74 on admission. Patient takes norvasc 10mg daily and chlorthalidone 50 mg daily at home.   -held anti-hypertensives , can re-start when d/alberta     #Tachycardia   on admission likely secondary to asthma exacerbation and from multiple rounds of nebulizer treatment (side effect). Upon assessment in the ED he denied any chest pain and palpitations  - Troponin negative  - EKG NSR  -tachy resolved     Inpatient treatment course:   In ED, vitals: 98F (axillary), , /74, satting 94% on CPAP, RR 30; transitioned to BiPAP, satting 96%, RR 20  Labs: notable for WBC 10.83, Hgb 16.8/Hct 50.2%, INR 1.26, Cl 95, HCO3 32, Glc 209; lactate, trop-T, proBNP wnl; VBG on CPAP: pH 7.24/pCO2 81/pO2 34; VBG on BiPAP pH 7.31/pCO2 67/pO2 37  Imaging: CXR (wet read) - increased L pulm vasculature markings  Was given solumedrol 125mg IVP x1, albuterol nebs x5, duonebs x1, Mag 2g. Intermittently placed on BiPAP with significant improvement with air movement. Later transitioned to 4LNC.   Overnight, started solumedrol 40mg IV BID dosing, standing duonebs q4h, pantoprazole 40mg and sliding scale. RVP negative. weaned down to RA at 615am.   7/7: satting well on RA, asymptomatic, breathing better. switched standing duonebs to PRN, d/alberta solumedrol and switched to PO 40mg prednisone.     New medications: Prednisone 40mg daily   Labs to be followed outpatient: CBC, CMP  Exam to be followed outpatient: none     ******************************************************  ATTENDING ATTESTATION    I have read and agree with the resident Discharge Note above. Patient seen and discussed with resident team on the day of discharge.     Briefly,  45 year old man with hx of asthma, presenting with   acute hypoxemic respiratory failure in the setting of acute asthma exacerbation   Improved on duonebs, steroids.   Reviewed surescripts -- patient is actually on Symbicort 160-4.5. -- already on high dose ICS. Last exacerbation requiring hospitalization was 2 years ago.   Patient says he has been 'running low' on albuterol inhaler -- script sent for refill   Has scripts for loratadine written by PCP, presumably for allergic component to asthma. Patient says he has picked up loratadine from pharmacy, but has not started taking. Encouraged to start taking loratadine as prescribed,       Physical Exam:    Gen: sitting upright in bed at time of exam, obese, in no acute distress.   HEENT: NCAT, MMM, clear OP  Neck: supple, trachea at midline  CV: RRR, +S1/S2  Pulm: adequate respiratory effort, no increased work of breathing; no wheezing at time of exam  Abd: soft, NTND  Skin: warm and dry, no new rashes vs prior report  Ext: WWP no LE edema  Neuro: AOx3, no gross focal neurological deficits  Psych: affect and behavior appropriate    I was physically present for the evaluation and management services provided. I agree with the included history, physical, and plan which I reviewed and edited where appropriate. I spent > 30 minutes with the patient and the patient's family on direct patient care and discharge planning with more than 50% of the visit spent on counseling and/or coordination of care.

## 2021-07-08 NOTE — DISCHARGE NOTE PROVIDER - PROVIDER TOKENS
PROVIDER:[TOKEN:[[id number]]] FREE:[LAST:[Mamie],FIRST:[Sherman],PHONE:[phone]],FAX:[(   [phone]    -],ADDRESS:[INTERNAL MEDICINE  [address] [place] [address]],SCHEDULEDAPPT:[07/15/2021],SCHEDULEDAPPTTIME:[11:00 AM]]

## 2021-07-08 NOTE — DISCHARGE NOTE PROVIDER - CARE PROVIDER_API CALL
DYLON LANDERS  [id number]  [address] [address]  Phone: ([phone]  Fax: ([phone]  Follow Up Time:    Sherman Hatch  INTERNAL MEDICINE  [address] [place] [id number]  Phone: [phone]  Fax: (   )    -  Scheduled Appointment: 07/15/2021 11:00 AM

## 2021-07-08 NOTE — DISCHARGE NOTE NURSING/CASE MANAGEMENT/SOCIAL WORK - NSDCFUADDAPPT_GEN_ALL_CORE_FT
Please bring your Insurance card, Photo ID and Discharge paperwork to the following appointment:    (1) Please follow up with your Primary Care Provider, Dr. Sherman Hatch at [address] [place] [address] on 07/15/2021 at 11:00am.    Appointment was scheduled by Ms. STEFANY Olvera, Referral Coordinator.

## 2021-07-08 NOTE — DISCHARGE NOTE PROVIDER - REASON FOR ADMISSION
OT order received and chart reviewed.  Pt remains intubated and sedated, RN requesting to hold as pt not appropriate at this time.  Will re-attempt tomorrow if appropriate.   Asthma exacerbation

## 2021-07-14 DIAGNOSIS — J45.901 UNSPECIFIED ASTHMA WITH (ACUTE) EXACERBATION: ICD-10-CM

## 2021-07-14 DIAGNOSIS — R00.0 TACHYCARDIA, UNSPECIFIED: ICD-10-CM

## 2021-07-14 DIAGNOSIS — Z87.891 PERSONAL HISTORY OF NICOTINE DEPENDENCE: ICD-10-CM

## 2021-07-14 DIAGNOSIS — J96.01 ACUTE RESPIRATORY FAILURE WITH HYPOXIA: ICD-10-CM

## 2021-07-14 DIAGNOSIS — I10 ESSENTIAL (PRIMARY) HYPERTENSION: ICD-10-CM

## 2021-08-06 DIAGNOSIS — Z71.89 OTHER SPECIFIED COUNSELING: ICD-10-CM

## 2021-09-01 PROCEDURE — G9005: CPT

## 2022-07-04 ENCOUNTER — EMERGENCY (EMERGENCY)
Facility: HOSPITAL | Age: 47
LOS: 0 days | Discharge: HOME | End: 2022-07-04
Attending: STUDENT IN AN ORGANIZED HEALTH CARE EDUCATION/TRAINING PROGRAM

## 2022-07-04 VITALS
RESPIRATION RATE: 18 BRPM | SYSTOLIC BLOOD PRESSURE: 123 MMHG | HEART RATE: 99 BPM | OXYGEN SATURATION: 99 % | DIASTOLIC BLOOD PRESSURE: 85 MMHG | TEMPERATURE: 98 F

## 2022-07-04 VITALS
DIASTOLIC BLOOD PRESSURE: 71 MMHG | TEMPERATURE: 99 F | RESPIRATION RATE: 18 BRPM | HEIGHT: 69 IN | WEIGHT: 259.93 LBS | OXYGEN SATURATION: 96 % | HEART RATE: 96 BPM | SYSTOLIC BLOOD PRESSURE: 118 MMHG

## 2022-07-04 DIAGNOSIS — J45.909 UNSPECIFIED ASTHMA, UNCOMPLICATED: ICD-10-CM

## 2022-07-04 DIAGNOSIS — I10 ESSENTIAL (PRIMARY) HYPERTENSION: ICD-10-CM

## 2022-07-04 DIAGNOSIS — F10.929 ALCOHOL USE, UNSPECIFIED WITH INTOXICATION, UNSPECIFIED: ICD-10-CM

## 2022-07-04 LAB — GLUCOSE BLDC GLUCOMTR-MCNC: 110 MG/DL — HIGH (ref 70–99)

## 2022-07-04 PROCEDURE — 99283 EMERGENCY DEPT VISIT LOW MDM: CPT

## 2022-07-04 NOTE — ED ADULT NURSE NOTE - OBJECTIVE STATEMENT
Patient BIBA for intoxication. Pt able to talk but not walking straight. Pt denies fall. Pt mostly sleeping at this time.

## 2022-07-04 NOTE — ED ADULT TRIAGE NOTE - CHIEF COMPLAINT QUOTE
BBIEMS stating he "had a lot beer today" and wants to sober up before going to the shelter. denies: fall

## 2022-07-04 NOTE — ED ADULT NURSE NOTE - NSIMPLEMENTINTERV_GEN_ALL_ED
Implemented All Fall Risk Interventions:  [place] to call system. Call bell, personal items and telephone within reach. Instruct patient to call for assistance. Room bathroom lighting operational. Non-slip footwear when patient is off stretcher. Physically safe environment: no spills, clutter or unnecessary equipment. Stretcher in lowest position, wheels locked, appropriate side rails in place. Provide visual cue, wrist band, yellow gown, etc. Monitor gait and stability. Monitor for mental status changes and reorient to person, place, and time. Review medications for side effects contributing to fall risk. Reinforce activity limits and safety measures with patient and family.

## 2022-07-04 NOTE — ED PROVIDER NOTE - PHYSICAL EXAMINATION
GEN: Awake, alert, interactive, NAD. + clinically intoxicated, no evidence trauma.   HEAD AND NECK: NC/AT. Airway patent. Neck supple.   EYES:  Clear b/l. EOMI. PERRL.   ENT: Moist mucus membranes.   CARDIAC: Regular rate, regular rhythm. No evident pedal edema.    RESP/CHEST: Normal respiratory effort with no use of accessory muscles or retractions. Clear throughout on auscultation.  ABD: Soft, non-distended, non-tender. No rebound, no guarding.   BACK: No midline spinal TTP. No CVAT.   EXTREMITIES: Moving all extremities with no apparent deformities.   SKIN: Warm, dry, intact normal color. No rash.   NEURO: AOx3, CN II-XII grossly intact, no focal deficits.   PSYCH: Appropriate mood and affect. GEN: Awake, alert, interactive, NAD. + clinically intoxicated, no evidence trauma.   HEAD AND NECK: NC/AT. Airway patent. Neck supple.   EYES:  Clear b/l. EOMI. PERRL.   ENT: Moist mucus membranes. Pt w/ paper ball w/in L external ear canal.   CARDIAC: Regular rate, regular rhythm. No evident pedal edema.    RESP/CHEST: Normal respiratory effort with no use of accessory muscles or retractions. Clear throughout on auscultation.  ABD: Soft, non-distended, non-tender. No rebound, no guarding.   BACK: No midline spinal TTP. No CVAT.   EXTREMITIES: Moving all extremities with no apparent deformities.   SKIN: Warm, dry, intact normal color. No rash.   NEURO: AOx3, CN II-XII grossly intact, no focal deficits.   PSYCH: Appropriate mood and affect.

## 2022-07-04 NOTE — ED ADULT NURSE REASSESSMENT NOTE - NS ED NURSE REASSESS COMMENT FT1
pt received from previous shift, currently sleeping NAD. awaiting to metabolize to freedom. will continue to monitor

## 2022-07-04 NOTE — ED ADULT NURSE NOTE - CAS TRG GENERAL AIRWAY, MLM
You may be receiving a patient experience survey by mail or e-mail from the Urgent Care Staff regarding your visit today.  We value your feedback and hope you can provide us your insight.    Do not use Ace wrap on right elbow.  Was placed on too tight for too long.  Now have dependent edema in right  arm.  Elevate arm.  Swelling and petechiae should resolve in 24-72 hours.   Patent

## 2022-07-04 NOTE — ED PROVIDER NOTE - PATIENT PORTAL LINK FT
You can access the FollowMyHealth Patient Portal offered by [facility] by registering at the following website: [email or web address]. By joining [employer]’s FollowMyHealth portal, you will also be able to view your health information using other applications (apps) compatible with our system.

## 2022-07-04 NOTE — ED PROVIDER NOTE - CLINICAL SUMMARY MEDICAL DECISION MAKING FREE TEXT BOX
46M w/ PMH HTN, asthma BIBEMS for presumed EtOH intoxication. AF, VSS. + clinically intoxicated, no evidence trauma. FS WNL. Plan: monitor until clinical sobriety. 46M w/ PMH HTN, asthma BIBEMS for presumed EtOH intoxication. AF, VSS. + clinically intoxicated, no evidence trauma. FS WNL. Plan: monitor until clinical sobriety. Pt care signed out at change of shift.

## 2022-07-04 NOTE — ED PROVIDER NOTE - OBJECTIVE STATEMENT
46M PMH HTN, asthma BIBEMS for presumed EtOH intoxication. Pt admit to drinking 'a lot of beers' b/c it was the 4th of July. Pt questioning why he was brought here all the way from [place]. Denies trauma or fall. Pt w/o complaints.     PMH as above, PSH non contributory, NKDA, meds as listed.

## 2023-10-25 NOTE — ED ADULT NURSE NOTE - HISTORY OF COVID-19 VACCINATION
For information on Fall & Injury Prevention, visit: [email or web address] OR  [email or web address] OR  https://www.cdc.gov/steadi/patient.html
Vaccine status unknown

## 2024-01-04 NOTE — ED PROVIDER NOTE - CCCP TRG CHIEF CMPLNT
Prior Authorization Not Needed per Insurance    Medication: DEXCOM G6 SENSOR MISC  Insurance Company: [employer] - Phone [phone] Fax [phone]  Expected CoPay: $    Pharmacy Filling the Rx: [facility] - [place], [place] - [facility] [address]   Pharmacy Notified: no  Patient Notified: no    Ins will not allow us to submit renewal PA yet.  Will wait one week    Key: CASA        ingestion

## 2024-03-07 RX ORDER — AMLODIPINE BESYLATE 2.5 MG/1
1 TABLET ORAL
Qty: 0 | Refills: 0 | DISCHARGE

## 2024-03-07 RX ORDER — ALBUTEROL 90 UG/1
2 AEROSOL, METERED ORAL
Qty: 0 | Refills: 0 | DISCHARGE

## 2024-03-07 RX ORDER — BUDESONIDE AND FORMOTEROL FUMARATE DIHYDRATE 160; 4.5 UG/1; UG/1
2 AEROSOL RESPIRATORY (INHALATION)
Qty: 0 | Refills: 0 | DISCHARGE

## 2024-03-07 RX ORDER — CHLORTHALIDONE 50 MG
1 TABLET ORAL
Qty: 0 | Refills: 0 | DISCHARGE
